# Patient Record
Sex: FEMALE | Race: AMERICAN INDIAN OR ALASKA NATIVE | NOT HISPANIC OR LATINO
[De-identification: names, ages, dates, MRNs, and addresses within clinical notes are randomized per-mention and may not be internally consistent; named-entity substitution may affect disease eponyms.]

---

## 2022-12-14 PROBLEM — Z00.00 ENCOUNTER FOR PREVENTIVE HEALTH EXAMINATION: Status: ACTIVE | Noted: 2022-12-14

## 2022-12-16 ENCOUNTER — APPOINTMENT (OUTPATIENT)
Dept: NEUROSURGERY | Facility: CLINIC | Age: 72
End: 2022-12-16

## 2022-12-16 VITALS — WEIGHT: 144 LBS | HEIGHT: 62 IN | BODY MASS INDEX: 26.5 KG/M2

## 2022-12-16 PROCEDURE — 99204 OFFICE O/P NEW MOD 45 MIN: CPT

## 2022-12-30 ENCOUNTER — APPOINTMENT (OUTPATIENT)
Dept: NEUROSURGERY | Facility: CLINIC | Age: 72
End: 2022-12-30
Payer: MEDICARE

## 2022-12-30 PROCEDURE — 99213 OFFICE O/P EST LOW 20 MIN: CPT

## 2022-12-30 NOTE — HISTORY OF PRESENT ILLNESS
[de-identified] : 72 year old female present with evaluation of her lower back pain. She has radicular symptoms down her left leg down lateral thigh and front of calf going to her toes. She has associated numbness in that area as well. This has been going on since September of this year with insidious onset. She states she has a longstanding history of lower back pain. She first had similar symptoms back in 2017 where she received 2 AMRIK w/ pain management with resolution of her symptoms. She states she went back to her pain management back in September for AMRIK. She has received 4 injections (last one in November 2022) with initial relief but now with no relief in her symptoms. She has been participating with physical therapy and acupuncture continously since September without relief. \par \par She has trialled celebrex and gabapentin. She states that the gabapentin helps her intermittently but the pain still affects her daily life. She ambulates with a cane for stabiliaation. She states she is unable to walk more than 2 minutes before having to stop secondary to pain. Pain is aggravated by standing/walking and alleviated by laying down flat completely. She states she is having urinary frequency and urgency and retention as well.  [FreeTextEntry1] : 72 year old female here for followup. She endorse low back pain with left leg radicular symptoms similar to last visit. Still endorse she is unable to walk, endorse she is unable to sleep at night secondary to pain. The pain medications used to help however does not seem to help her as much as the past

## 2022-12-30 NOTE — HISTORY OF PRESENT ILLNESS
[de-identified] : 72 year old female present with evaluation of her lower back pain. She has radicular symptoms down her left leg down lateral thigh and front of calf going to her toes. She has associated numbness in that area as well. This has been going on since September of this year with insidious onset. She states she has a longstanding history of lower back pain. She first had similar symptoms back in 2017 where she received 2 AMRIK w/ pain management with resolution of her symptoms. She states she went back to her pain management back in September for AMRIK. She has received 4 injections (last one in November 2022) with initial relief but now with no relief in her symptoms. She has been participating with physical therapy and acupuncture continously since September without relief. \par \par She has trialled celebrex and gabapentin. She states that the gabapentin helps her intermittently but the pain still affects her daily life. She ambulates with a cane for stabiliaation. She states she is unable to walk more than 2 minutes before having to stop secondary to pain. Pain is aggravated by standing/walking and alleviated by laying down flat completely. She states she is having urinary frequency and urgency and retention as well.  [FreeTextEntry1] : 72 year old female here for followup. She endorse low back pain with left leg radicular symptoms similar to last visit. Still endorse she is unable to walk, endorse she is unable to sleep at night secondary to pain. The pain medications used to help however does not seem to help her as much as the past

## 2022-12-30 NOTE — HISTORY OF PRESENT ILLNESS
[de-identified] : 72 year old female present with evaluation of her lower back pain. She has radicular symptoms down her left leg down lateral thigh and front of calf going to her toes. She has associated numbness in that area as well. This has been going on since September of this year with insidious onset. She states she has a longstanding history of lower back pain. She first had similar symptoms back in 2017 where she received 2 AMRIK w/ pain management with resolution of her symptoms. She states she went back to her pain management back in September for AMRIK. She has received 4 injections (last one in November 2022) with initial relief but now with no relief in her symptoms. She has been participating with physical therapy and acupuncture continously since September without relief. \par \par She has trialled celebrex and gabapentin. She states that the gabapentin helps her intermittently but the pain still affects her daily life. She ambulates with a cane for stabiliaation. She states she is unable to walk more than 2 minutes before having to stop secondary to pain. Pain is aggravated by standing/walking and alleviated by laying down flat completely. She states she is having urinary frequency and urgency and retention as well.  [FreeTextEntry1] : 72 year old female here for followup. She endorse low back pain with left leg radicular symptoms similar to last visit. Still endorse she is unable to walk, endorse she is unable to sleep at night secondary to pain. The pain medications used to help however does not seem to help her as much as the past

## 2022-12-30 NOTE — PHYSICAL EXAM
[FreeTextEntry1] : awake alert in no acute distress\par Motor strength intact other than left leg hip flex 4/5, knee flex/ext 3/5, DF 3/5 PF 5/5\par Sensation grossly intact\par Positive straight leg raise on the left\par Antaglic gait\par

## 2022-12-30 NOTE — ASSESSMENT
[FreeTextEntry1] : 72 year old female here for surgical planning. We redisucssed her MRI findings - shows l4-5 stenosis with severe left lateral recess narrowing. This is likely causing her symptoms; Patient is a candidate for surgery. Risks and benefits discussed with patient and daughter including but not limited to paralysis, persistent numbness, bleeding, need for further surgeries, CSF leak. We discussed that there is a possibility that the weakness does not get better. Patient and daughter verbalize understanding and would like to proceed with surgery.

## 2023-01-05 ENCOUNTER — INPATIENT (INPATIENT)
Facility: HOSPITAL | Age: 73
LOS: 0 days | Discharge: HOME | End: 2023-01-06
Attending: NEUROLOGICAL SURGERY | Admitting: NEUROLOGICAL SURGERY
Payer: MEDICARE

## 2023-01-05 VITALS
SYSTOLIC BLOOD PRESSURE: 160 MMHG | RESPIRATION RATE: 16 BRPM | OXYGEN SATURATION: 95 % | WEIGHT: 145.06 LBS | HEART RATE: 83 BPM | TEMPERATURE: 97 F | DIASTOLIC BLOOD PRESSURE: 83 MMHG

## 2023-01-05 LAB
ALBUMIN SERPL ELPH-MCNC: 5.3 G/DL — HIGH (ref 3.5–5.2)
ALP SERPL-CCNC: 143 U/L — HIGH (ref 30–115)
ALT FLD-CCNC: 14 U/L — SIGNIFICANT CHANGE UP (ref 0–41)
ANION GAP SERPL CALC-SCNC: 11 MMOL/L — SIGNIFICANT CHANGE UP (ref 7–14)
APPEARANCE UR: CLEAR — SIGNIFICANT CHANGE UP
APTT BLD: 42.5 SEC — HIGH (ref 27–39.2)
AST SERPL-CCNC: 21 U/L — SIGNIFICANT CHANGE UP (ref 0–41)
BASOPHILS # BLD AUTO: 0.04 K/UL — SIGNIFICANT CHANGE UP (ref 0–0.2)
BASOPHILS NFR BLD AUTO: 1.1 % — HIGH (ref 0–1)
BILIRUB SERPL-MCNC: 0.6 MG/DL — SIGNIFICANT CHANGE UP (ref 0.2–1.2)
BILIRUB UR-MCNC: NEGATIVE — SIGNIFICANT CHANGE UP
BUN SERPL-MCNC: 23 MG/DL — HIGH (ref 10–20)
CALCIUM SERPL-MCNC: 10.3 MG/DL — SIGNIFICANT CHANGE UP (ref 8.4–10.5)
CHLORIDE SERPL-SCNC: 102 MMOL/L — SIGNIFICANT CHANGE UP (ref 98–110)
CO2 SERPL-SCNC: 27 MMOL/L — SIGNIFICANT CHANGE UP (ref 17–32)
COLOR SPEC: COLORLESS — SIGNIFICANT CHANGE UP
CREAT SERPL-MCNC: 0.9 MG/DL — SIGNIFICANT CHANGE UP (ref 0.7–1.5)
DIFF PNL FLD: NEGATIVE — SIGNIFICANT CHANGE UP
EGFR: 67 ML/MIN/1.73M2 — SIGNIFICANT CHANGE UP
EOSINOPHIL # BLD AUTO: 0.07 K/UL — SIGNIFICANT CHANGE UP (ref 0–0.7)
EOSINOPHIL NFR BLD AUTO: 1.9 % — SIGNIFICANT CHANGE UP (ref 0–8)
GLUCOSE SERPL-MCNC: 95 MG/DL — SIGNIFICANT CHANGE UP (ref 70–99)
GLUCOSE UR QL: NEGATIVE — SIGNIFICANT CHANGE UP
HCT VFR BLD CALC: 39.8 % — SIGNIFICANT CHANGE UP (ref 37–47)
HGB BLD-MCNC: 12.8 G/DL — SIGNIFICANT CHANGE UP (ref 12–16)
IMM GRANULOCYTES NFR BLD AUTO: 0.5 % — HIGH (ref 0.1–0.3)
INR BLD: 0.95 RATIO — SIGNIFICANT CHANGE UP (ref 0.65–1.3)
KETONES UR-MCNC: NEGATIVE — SIGNIFICANT CHANGE UP
LEUKOCYTE ESTERASE UR-ACNC: NEGATIVE — SIGNIFICANT CHANGE UP
LYMPHOCYTES # BLD AUTO: 1.02 K/UL — LOW (ref 1.2–3.4)
LYMPHOCYTES # BLD AUTO: 27.1 % — SIGNIFICANT CHANGE UP (ref 20.5–51.1)
MAGNESIUM SERPL-MCNC: 2.2 MG/DL — SIGNIFICANT CHANGE UP (ref 1.8–2.4)
MCHC RBC-ENTMCNC: 30.2 PG — SIGNIFICANT CHANGE UP (ref 27–31)
MCHC RBC-ENTMCNC: 32.2 G/DL — SIGNIFICANT CHANGE UP (ref 32–37)
MCV RBC AUTO: 93.9 FL — SIGNIFICANT CHANGE UP (ref 81–99)
MONOCYTES # BLD AUTO: 0.35 K/UL — SIGNIFICANT CHANGE UP (ref 0.1–0.6)
MONOCYTES NFR BLD AUTO: 9.3 % — SIGNIFICANT CHANGE UP (ref 1.7–9.3)
NEUTROPHILS # BLD AUTO: 2.27 K/UL — SIGNIFICANT CHANGE UP (ref 1.4–6.5)
NEUTROPHILS NFR BLD AUTO: 60.1 % — SIGNIFICANT CHANGE UP (ref 42.2–75.2)
NITRITE UR-MCNC: NEGATIVE — SIGNIFICANT CHANGE UP
NRBC # BLD: 0 /100 WBCS — SIGNIFICANT CHANGE UP (ref 0–0)
PH UR: 6 — SIGNIFICANT CHANGE UP (ref 5–8)
PLATELET # BLD AUTO: 221 K/UL — SIGNIFICANT CHANGE UP (ref 130–400)
POTASSIUM SERPL-MCNC: 4.4 MMOL/L — SIGNIFICANT CHANGE UP (ref 3.5–5)
POTASSIUM SERPL-SCNC: 4.4 MMOL/L — SIGNIFICANT CHANGE UP (ref 3.5–5)
PROT SERPL-MCNC: 8.2 G/DL — HIGH (ref 6–8)
PROT UR-MCNC: NEGATIVE — SIGNIFICANT CHANGE UP
PROTHROM AB SERPL-ACNC: 10.8 SEC — SIGNIFICANT CHANGE UP (ref 9.95–12.87)
RBC # BLD: 4.24 M/UL — SIGNIFICANT CHANGE UP (ref 4.2–5.4)
RBC # FLD: 12.9 % — SIGNIFICANT CHANGE UP (ref 11.5–14.5)
SARS-COV-2 RNA SPEC QL NAA+PROBE: SIGNIFICANT CHANGE UP
SODIUM SERPL-SCNC: 140 MMOL/L — SIGNIFICANT CHANGE UP (ref 135–146)
SP GR SPEC: 1.01 — SIGNIFICANT CHANGE UP (ref 1.01–1.03)
TROPONIN T SERPL-MCNC: <0.01 NG/ML — SIGNIFICANT CHANGE UP
UROBILINOGEN FLD QL: SIGNIFICANT CHANGE UP
WBC # BLD: 3.77 K/UL — LOW (ref 4.8–10.8)
WBC # FLD AUTO: 3.77 K/UL — LOW (ref 4.8–10.8)

## 2023-01-05 PROCEDURE — 99285 EMERGENCY DEPT VISIT HI MDM: CPT

## 2023-01-05 PROCEDURE — 71045 X-RAY EXAM CHEST 1 VIEW: CPT | Mod: 26

## 2023-01-05 PROCEDURE — 93010 ELECTROCARDIOGRAM REPORT: CPT

## 2023-01-05 RX ORDER — MORPHINE SULFATE 50 MG/1
2 CAPSULE, EXTENDED RELEASE ORAL ONCE
Refills: 0 | Status: DISCONTINUED | OUTPATIENT
Start: 2023-01-05 | End: 2023-01-05

## 2023-01-05 RX ORDER — PANTOPRAZOLE SODIUM 20 MG/1
40 TABLET, DELAYED RELEASE ORAL
Refills: 0 | Status: DISCONTINUED | OUTPATIENT
Start: 2023-01-05 | End: 2023-01-06

## 2023-01-05 RX ADMIN — MORPHINE SULFATE 2 MILLIGRAM(S): 50 CAPSULE, EXTENDED RELEASE ORAL at 13:36

## 2023-01-05 RX ADMIN — MORPHINE SULFATE 2 MILLIGRAM(S): 50 CAPSULE, EXTENDED RELEASE ORAL at 10:58

## 2023-01-05 NOTE — ED PROVIDER NOTE - PHYSICAL EXAMINATION
- Physical Exam in ED  * General Appearance: Alert, cooperative, interactive, oriented to time, place, and person, in some distress secondary to pain  * Head: Normocephalic, without obvious abnormality, atraumatic  * Eyes: PERRL, conjunctiva/corneas clear, EOM's intact, fundi benign, both eyes  * Back: Symmetric, no curvature, ROM limited due to pain, minimal tenderness along palpation of lower back, no CVA tenderness, unable to assess for left raise leg since patient on a chair in hallway (was positive on left on 12/16), antalgic gait with cane  * Lungs: Respirations unlabored, Good bilateral air entry, normal breath sounds (Clear to auscultation bilaterally, no audible wheezes, crackles, or rhonchi)  * Heart: Regular Rate and Rhythm, normal S1 and S2, no audible murmur, rub, or gallop  * Abdomen: Symmetric, non-distended, no scar, soft, non-tender, bowel sounds active all four quadrants, no masses, no organomegaly (no hepatosplenomegaly)  * Extremities: Extremities normal, atraumatic, no cyanosis, no lower extremity pitting edema bilaterally, adequate dorsalis pedis pulses  * Pulses: 2+ and symmetric all extremities  * Skin: Skin color, texture, turgor normal, no rashes or lesions  * Lymph nodes: Cervical, supraclavicular, and axillary nodes normal  * Neurologic: CNII-XII intact, limited motor power 4/5 along left leg possibly limited by pain, limited reflex exam

## 2023-01-05 NOTE — ED PROVIDER NOTE - ATTENDING CONTRIBUTION TO CARE
I personally evaluated the patient. I reviewed the Resident’s or Physician Assistant’s note (as assigned above), and agree with the findings and plan except as documented in my note.   72-year-old female past medical history significant for dyslipidemia, diabetes, GERD presents to the ED with lower back pain rating to the left leg.  Pain radiates down the leg to the level of the toes.  Positive associated paresthesias of the left leg.  Patient has some relief at home with NSAIDs and gabapentin.  Patient is currently ambulating with a cane.  Patient is scheduled for L4-S1 decompressive laminectomy tomorrow.  Today patient's pain worsened in severity and she spoke with her neurosurgeon who recommended inpatient admission for pain control until surgery.  Vitals noted.  CONSTITUTIONAL: Well-appearing; well-nourished; in no apparent distress.   HEAD: Normocephalic; atraumatic.   EYES: PERRL; EOM intact. Conjunctiva normal B/L.   ENT: Normal pharynx with no tonsillar hypertrophy. MMM.  NECK: Supple; non-tender; no cervical lymphadenopathy.   CHEST: Normal chest excursion with respiration.   CARDIOVASCULAR: Normal S1, S2; no murmurs, rubs, or gallops.   RESPIRATORY: Normal chest excursion with respiration; breath sounds clear and equal bilaterally; no wheezes, rhonchi, or rales.  GI/: Normal bowel sounds; non-distended; non-tender.  BACK: No evidence of trauma or deformity. Non-tender to palpation. No CVA tenderness. + L SLR.   EXT: Normal ROM in all four extremities; non-tender to palpation; distal pulses are normal. No leg edema B/L.   SKIN: Normal for age and race; warm; dry; good turgor.  NEURO: A & O x 4; CN 2-12 intact. Neuro exam limited by pain.  No motor or sensory deficit of the lower extremities.  2+ patellar DTRs bilaterally. Patient with antalgic gait.

## 2023-01-05 NOTE — ED PROVIDER NOTE - CLINICAL SUMMARY MEDICAL DECISION MAKING FREE TEXT BOX
Lumbar Radiculopathy 72-year-old female with lumbar radiculopathy not improving with outpatient management.  Patient scheduled for decompressive laminectomy tomorrow.  Patient given opioid analgesia in the ED.  Neurosurgery consulted.  Patient admitted to neurosurgical service.

## 2023-01-05 NOTE — H&P ADULT - HISTORY OF PRESENT ILLNESS
72y F patient of Dr. Zimmerman comes to ED with intractable lumbar back pain with radiculopathy not alleviated by conservative measures. Pt scheduled for OR tomorrow 1.6 for Lumbar L4-5 microdiscectomy.  72y F patient of Dr. Zimmerman comes to ED with intractable lumbar back pain with radiculopathy not alleviated by conservative measures. Pt scheduled for OR tomorrow 1.6 for Lumbar L4-5 microdiscectomy.       Subjective: 72yFemale with a pmhx of LUMBAR RADICULOPATHY    LUMBAR RADICULOPATHY  ...    Gastro-esophageal reflux disease without esophagitis    Hyperlipidemia, unspecified    Radiculopathy, lumbar region    Type 2 diabetes mellitus without complications    ^SCIATIC NERVE PAIN    MEWS Score    Dyslipidemia    Diabetes mellitus    GERD (gastroesophageal reflux disease)    Lumbar radiculopathy    SCIATIC NERVE PAIN    SysAdmin_VisitLink      s/p     Allergies    No Known Allergies    Intolerances        Vital Signs Last 24 Hrs  T(C): 36.1 (05 Jan 2023 08:16), Max: 36.1 (05 Jan 2023 08:16)  T(F): 97 (05 Jan 2023 08:16), Max: 97 (05 Jan 2023 08:16)  HR: 83 (05 Jan 2023 08:16) (83 - 83)  BP: 160/83 (05 Jan 2023 08:16) (160/83 - 160/83)  BP(mean): --  RR: 16 (05 Jan 2023 08:16) (16 - 16)  SpO2: 95% (05 Jan 2023 08:16) (95% - 95%)      pantoprazole    Tablet 40 milliGRAM(s) Oral before breakfast          REVIEW OF SYSTEMS    [x ] A ten-point review of systems was otherwise negative except as noted.  [ ] Due to altered mental status/intubation, subjective information were not able to be obtained from the patient. History was obtained, to the extent possible, from review of the chart and collateral sources of information.      Exam:  Cantonese speaking pt  Resting in bed in NAD  AAOX3. Verbal function intact  Negative B/L SLR  Motor: MAEx4  B/L UE 5/5  RLE 5/5  LLE 4/5  B/L DF PF 5/5  No clonus   Sensation: SILT        CBC Full  -  ( 05 Jan 2023 10:44 )  WBC Count : 3.77 K/uL  RBC Count : 4.24 M/uL  Hemoglobin : 12.8 g/dL  Hematocrit : 39.8 %  Platelet Count - Automated : 221 K/uL  Mean Cell Volume : 93.9 fL  Mean Cell Hemoglobin : 30.2 pg  Mean Cell Hemoglobin Concentration : 32.2 g/dL  Auto Neutrophil # : 2.27 K/uL  Auto Lymphocyte # : 1.02 K/uL  Auto Monocyte # : 0.35 K/uL  Auto Eosinophil # : 0.07 K/uL  Auto Basophil # : 0.04 K/uL  Auto Neutrophil % : 60.1 %  Auto Lymphocyte % : 27.1 %  Auto Monocyte % : 9.3 %  Auto Eosinophil % : 1.9 %  Auto Basophil % : 1.1 %    01-05    140  |  102  |  23<H>  ----------------------------<  95  4.4   |  27  |  0.9    Ca    10.3      05 Jan 2023 10:44  Mg     2.2     01-05    TPro  8.2<H>  /  Alb  5.3<H>  /  TBili  0.6  /  DBili  x   /  AST  21  /  ALT  14  /  AlkPhos  143<H>  01-05    PT/INR - ( 05 Jan 2023 10:44 )   PT: 10.80 sec;   INR: 0.95 ratio         PTT - ( 05 Jan 2023 10:44 )  PTT:42.5 sec      Assessment/Plan:   This is a 72 year old female with lower lumbar back radicular pain, failed conservative management including pain treatment and AMRIK now admitted for tentative L4-5 MLD      Scheduled for L4-5 MLD tomorrow 1/6  Pre op labs; cbc bmp, coags, covid pcr  CXR, EKG  NPO at [facility]  Hold ac/ap   72y F patient of Dr. Zimmerman comes to ED with intractable lumbar back pain with radiculopathy not alleviated by conservative measures. Pt scheduled for OR tomorrow 1.6 for Lumbar L4-5 microdiscectomy.       Subjective: 72yFemale with a pmhx of LUMBAR RADICULOPATHY    LUMBAR RADICULOPATHY  ...    Gastro-esophageal reflux disease without esophagitis    Hyperlipidemia, unspecified    Radiculopathy, lumbar region    Type 2 diabetes mellitus without complications    ^SCIATIC NERVE PAIN    MEWS Score    Dyslipidemia    Diabetes mellitus    GERD (gastroesophageal reflux disease)    Lumbar radiculopathy    SCIATIC NERVE PAIN    SysAdmin_VisitLink      s/p     Allergies    No Known Allergies    Intolerances        Vital Signs Last 24 Hrs  T(C): 36.1 (05 Jan 2023 08:16), Max: 36.1 (05 Jan 2023 08:16)  T(F): 97 (05 Jan 2023 08:16), Max: 97 (05 Jan 2023 08:16)  HR: 83 (05 Jan 2023 08:16) (83 - 83)  BP: 160/83 (05 Jan 2023 08:16) (160/83 - 160/83)  BP(mean): --  RR: 16 (05 Jan 2023 08:16) (16 - 16)  SpO2: 95% (05 Jan 2023 08:16) (95% - 95%)      pantoprazole    Tablet 40 milliGRAM(s) Oral before breakfast          REVIEW OF SYSTEMS    [x ] A ten-point review of systems was otherwise negative except as noted.  [ ] Due to altered mental status/intubation, subjective information were not able to be obtained from the patient. History was obtained, to the extent possible, from review of the chart and collateral sources of information.      Exam:  Cantonese speaking pt  Resting in bed in NAD  AAOX3. Verbal function intact  no C/T/L ttp  Negative B/L SLR  Motor: MAEx4  B/L UE 5/5  RLE 5/5  LLE 4/5  B/L DF PF 5/5  No clonus   Sensation: SILT        CBC Full  -  ( 05 Jan 2023 10:44 )  WBC Count : 3.77 K/uL  RBC Count : 4.24 M/uL  Hemoglobin : 12.8 g/dL  Hematocrit : 39.8 %  Platelet Count - Automated : 221 K/uL  Mean Cell Volume : 93.9 fL  Mean Cell Hemoglobin : 30.2 pg  Mean Cell Hemoglobin Concentration : 32.2 g/dL  Auto Neutrophil # : 2.27 K/uL  Auto Lymphocyte # : 1.02 K/uL  Auto Monocyte # : 0.35 K/uL  Auto Eosinophil # : 0.07 K/uL  Auto Basophil # : 0.04 K/uL  Auto Neutrophil % : 60.1 %  Auto Lymphocyte % : 27.1 %  Auto Monocyte % : 9.3 %  Auto Eosinophil % : 1.9 %  Auto Basophil % : 1.1 %    01-05    140  |  102  |  23<H>  ----------------------------<  95  4.4   |  27  |  0.9    Ca    10.3      05 Jan 2023 10:44  Mg     2.2     01-05    TPro  8.2<H>  /  Alb  5.3<H>  /  TBili  0.6  /  DBili  x   /  AST  21  /  ALT  14  /  AlkPhos  143<H>  01-05    PT/INR - ( 05 Jan 2023 10:44 )   PT: 10.80 sec;   INR: 0.95 ratio         PTT - ( 05 Jan 2023 10:44 )  PTT:42.5 sec      Assessment/Plan:   This is a 72 year old female with lower lumbar back radicular pain, failed conservative management including pain treatment and AMRIK now admitted for tentative L4-5 MLD      Scheduled for L4-5 MLD tomorrow 1/6  Pre op labs; cbc bmp, coags, covid pcr  CXR, EKG  NPO at [facility]  Hold ac/ap

## 2023-01-05 NOTE — ED PROVIDER NOTE - OBJECTIVE STATEMENT
History of Present Illness  Ms. Vides is a 72 year old female patient known to have:  - Dyslipidemia  - Diabetes Mellitus  - GERD      She presented to the ED 01/05 for evaluation of worsening lower back pain.  History goes back to September when the patient started complaining of ...  On review of systems, patient denies any recent fever, chills, night sweats, URTI symptoms (cough, rhinorrhea, sore throat), urinary symptoms (urinary frequency, urgency, intermittence, dysuria, foul smelling urine, cloudy urine), change in bowel movements (diarrhea or constipation), abdominal pain, headache, nausea, or vomiting. No sick contacts. No recent travel or exposure to recent travelers.      Upon presentation to the ED, the patient was hemodynamically stable:  Vital Signs in ED History of Present Illness  Ms. Vides is a 72 year old female patient known to have:  - Dyslipidemia  - Diabetes Mellitus  - GERD      She presented to the ED 01/05 for evaluation of worsening lower back pain.  History goes back to September when the patient started complaining of lower back pain.  The onset was insidious and the pain has been sharp and radiating to left leg (lateral aspect of thigh, front of calf, to toes).  It has been associated with numbness along toes and urinary frequency.  The pain has been exacerbated by standing or walking, partially relieved by celebrex 200mg QD and Gabapentin 300mg BID/TID, thus limiting her ability to ambulate (now she uses a cane).  Of note, patient had chronic lower back pain since 2017 s/p 2 x AMRIK injections with pain management s/p resolution.  After it recurred in September, patient sought medical attention at pain management s/p 4 x AMRIK injections (last one 11/2022), PT, and acupuncture with minimal relief.  She has been evaluated by Dr Zimmerman (neurosurgeon) on 12/16 and 12/30 who reviewed an outpatient MR C-spine from 11/2022 showing multiple level degenerative disease (most at L4-5 with left lateral recess narrowing and bilateral moderate L5-S1 neural foraminal stenosis).  He recommended L4-5 and L5-S1 decompressive laminectomy since patient failed conservative therapy, and patient + family agreed (scheduled for 01/06 at 08:00 AM per patient).  Since today 01/05 pain was severe (09/10 intensity), patient contacted Dr Horan who recommended ED presentation.      On review of systems, patient denies any recent fever, chills, night sweats, URTI symptoms (cough, rhinorrhea, sore throat), change in bowel movements (diarrhea or constipation), abdominal pain, headache, nausea, or vomiting.   No sick contacts.  No recent travel or exposure to recent travelers.      Upon presentation to the ED, the patient was hemodynamically stable:  Vital Signs in ED  - /83mmHg  - HR 83bpm  - RR 16 bpm  - T 36.1 degrees        - Patient received IV morphine 2 x1 for pain  - Labs will be ordered  - Repeat imaging will be discussed with neurosurgery team History of Present Illness  Ms. Vides is a 72 year old female patient known to have:  - Dyslipidemia  - Diabetes Mellitus  - GERD      She presented to the ED 01/05 for evaluation of worsening lower back pain.  History goes back to September when the patient started complaining of lower back pain.  The onset was insidious and the pain has been sharp and radiating to left leg (lateral aspect of thigh, front of calf, to toes).  It has been associated with numbness along toes and urinary frequency.  The pain has been exacerbated by standing or walking, partially relieved by celebrex 200mg QD and Gabapentin 300mg BID/TID, thus limiting her ability to ambulate (now she uses a cane).  Of note, patient had chronic lower back pain since 2017 s/p 2 x AMRIK injections with pain management s/p resolution.  After it recurred in September, patient sought medical attention at pain management s/p 4 x AMRIK injections (last one 11/2022), PT, and acupuncture with minimal relief.  She has been evaluated by Dr Zimmerman (neurosurgeon) on 12/16 and 12/30 who reviewed an outpatient MR C-spine from 11/2022 showing multiple level degenerative disease (most at L4-5 with left lateral recess narrowing and bilateral moderate L5-S1 neural foraminal stenosis).  He recommended L4-5 and L5-S1 decompressive laminectomy since patient failed conservative therapy, and patient + family agreed (scheduled for 01/06 at 08:00 AM per patient).  Since today 01/05 pain was severe (09/10 intensity), patient contacted Dr Horan who recommended ED presentation.      On review of systems, patient denies any recent fever, chills, night sweats, URTI symptoms (cough, rhinorrhea, sore throat), change in bowel movements (diarrhea or constipation), abdominal pain, headache, nausea, or vomiting.   No sick contacts.  No recent travel or exposure to recent travelers.      Upon presentation to the ED, the patient was hemodynamically stable:  Vital Signs in ED  - /83mmHg  - HR 83bpm  - RR 16 bpm  - T 36.1 degrees        - Patient received IV morphine 2 x1 for pain  - Labs will be ordered  - Neurosurgery team was contacted and patient will be admitted under their service for L4-5 and possible L5-S1 decompressive laminectomy on 01/06/2022

## 2023-01-05 NOTE — H&P ADULT - ASSESSMENT
Admit to Neurosurgery Service Dr. Zimmerman   OR tomorroqw 1.6.23 for L4-5 MLD  NPO @ midnight tonight   Type and Screen   PT/PTT/INR  CXR  ECG  CBC / CMP

## 2023-01-05 NOTE — ED PROVIDER NOTE - NSICDXPASTMEDICALHX_GEN_ALL_CORE_FT
PAST MEDICAL HISTORY:  Diabetes mellitus     Dyslipidemia     GERD (gastroesophageal reflux disease)

## 2023-01-06 ENCOUNTER — RESULT REVIEW (OUTPATIENT)
Age: 73
End: 2023-01-06

## 2023-01-06 ENCOUNTER — TRANSCRIPTION ENCOUNTER (OUTPATIENT)
Age: 73
End: 2023-01-06

## 2023-01-06 VITALS
OXYGEN SATURATION: 95 % | RESPIRATION RATE: 18 BRPM | HEART RATE: 94 BPM | DIASTOLIC BLOOD PRESSURE: 73 MMHG | SYSTOLIC BLOOD PRESSURE: 132 MMHG

## 2023-01-06 LAB
GLUCOSE BLDC GLUCOMTR-MCNC: 116 MG/DL — HIGH (ref 70–99)
GLUCOSE BLDC GLUCOMTR-MCNC: 123 MG/DL — HIGH (ref 70–99)
HCV AB S/CO SERPL IA: 0.04 COI — SIGNIFICANT CHANGE UP
HCV AB SERPL-IMP: SIGNIFICANT CHANGE UP

## 2023-01-06 PROCEDURE — 88304 TISSUE EXAM BY PATHOLOGIST: CPT | Mod: 26

## 2023-01-06 PROCEDURE — 88311 DECALCIFY TISSUE: CPT | Mod: 26

## 2023-01-06 PROCEDURE — 63030 LAMOT DCMPRN NRV RT 1 LMBR: CPT | Mod: LT

## 2023-01-06 RX ORDER — HYDROCODONE BITARTRATE AND ACETAMINOPHEN 7.5; 325 MG/15ML; MG/15ML
1 SOLUTION ORAL
Qty: 30 | Refills: 0
Start: 2023-01-06 | End: 2023-01-15

## 2023-01-06 RX ORDER — SODIUM CHLORIDE 9 MG/ML
1000 INJECTION INTRAMUSCULAR; INTRAVENOUS; SUBCUTANEOUS
Refills: 0 | Status: DISCONTINUED | OUTPATIENT
Start: 2023-01-06 | End: 2023-01-06

## 2023-01-06 RX ORDER — CEPHALEXIN 500 MG
1 CAPSULE ORAL
Qty: 15 | Refills: 0
Start: 2023-01-06 | End: 2023-01-10

## 2023-01-06 RX ORDER — ONDANSETRON 8 MG/1
8 TABLET, FILM COATED ORAL ONCE
Refills: 0 | Status: DISCONTINUED | OUTPATIENT
Start: 2023-01-06 | End: 2023-01-06

## 2023-01-06 RX ORDER — HYDROMORPHONE HYDROCHLORIDE 2 MG/ML
0.5 INJECTION INTRAMUSCULAR; INTRAVENOUS; SUBCUTANEOUS
Refills: 0 | Status: DISCONTINUED | OUTPATIENT
Start: 2023-01-06 | End: 2023-01-06

## 2023-01-06 RX ORDER — ACETAMINOPHEN 500 MG
1000 TABLET ORAL ONCE
Refills: 0 | Status: COMPLETED | OUTPATIENT
Start: 2023-01-06 | End: 2023-01-06

## 2023-01-06 RX ORDER — ACETAMINOPHEN WITH CODEINE 300MG-30MG
1 TABLET ORAL
Qty: 30 | Refills: 0
Start: 2023-01-06 | End: 2023-01-15

## 2023-01-06 RX ORDER — SODIUM CHLORIDE 9 MG/ML
1000 INJECTION, SOLUTION INTRAVENOUS
Refills: 0 | Status: DISCONTINUED | OUTPATIENT
Start: 2023-01-06 | End: 2023-01-06

## 2023-01-06 RX ORDER — CYCLOBENZAPRINE HYDROCHLORIDE 10 MG/1
1 TABLET, FILM COATED ORAL
Qty: 30 | Refills: 0
Start: 2023-01-06 | End: 2023-01-15

## 2023-01-06 RX ORDER — DOCUSATE SODIUM 100 MG
1 CAPSULE ORAL
Qty: 30 | Refills: 0
Start: 2023-01-06 | End: 2023-01-15

## 2023-01-06 RX ADMIN — PANTOPRAZOLE SODIUM 40 MILLIGRAM(S): 20 TABLET, DELAYED RELEASE ORAL at 05:51

## 2023-01-06 RX ADMIN — Medication 1000 MILLIGRAM(S): at 02:40

## 2023-01-06 RX ADMIN — HYDROMORPHONE HYDROCHLORIDE 0.5 MILLIGRAM(S): 2 INJECTION INTRAMUSCULAR; INTRAVENOUS; SUBCUTANEOUS at 11:20

## 2023-01-06 RX ADMIN — SODIUM CHLORIDE 40 MILLILITER(S): 9 INJECTION INTRAMUSCULAR; INTRAVENOUS; SUBCUTANEOUS at 01:57

## 2023-01-06 RX ADMIN — Medication 400 MILLIGRAM(S): at 02:01

## 2023-01-06 RX ADMIN — HYDROMORPHONE HYDROCHLORIDE 0.5 MILLIGRAM(S): 2 INJECTION INTRAMUSCULAR; INTRAVENOUS; SUBCUTANEOUS at 11:35

## 2023-01-06 NOTE — PRE-ANESTHESIA EVALUATION ADULT - NSANTHPEFT_GEN_ALL_CORE
NAD  CTABL  RRR  As per H&P, motor wise:  no C/T/L ttp  Negative B/L SLR  Motor: MAEx4  B/L UE 5/5  RLE 5/5  LLE 4/5  B/L DF PF 5/5  No clonus

## 2023-01-06 NOTE — DISCHARGE NOTE PROVIDER - HOSPITAL COURSE
This is a 72 year old female to the surgical service and subsequently underwent L4-5 MLD with Dr. Zimmerman on 1/6. Pt tolerated procedure well and postoperatively was transferred to the PACU. Patient's diet was advanced as tolerated and pain was controlled with IV, and subsequently po, pain medication as needed. Pt's post-operative hospital course was uncomplicated.    At time of discharge, the patient was afebrile, tolerating a regular diet, voiding appropriately, ambulating without difficulty, making flatus, and the patient's pain was well controlled. VS were WNL and pt was hemodynamically stable. Pt was medically cleared for discharge and patient was provided with discharge instructions regarding, but not limited to medication regimen and follow up.   This is a 72 year old female admitted to the surgical service and subsequently underwent L4-5 MLD with Dr. Zimmerman on 1/6. Pt tolerated procedure well and postoperatively was transferred to the PACU. Patient's diet was advanced as tolerated and pain was controlled with IV, and subsequently po, pain medication as needed. Pt's post-operative hospital course was uncomplicated.    At time of discharge, the patient was afebrile, tolerating a regular diet, voiding appropriately, ambulating without difficulty, making flatus, and the patient's pain was well controlled. VS were WNL and pt was hemodynamically stable. Pt was medically cleared for discharge and patient was provided with discharge instructions regarding, but not limited to medication regimen and follow up.

## 2023-01-06 NOTE — DISCHARGE NOTE NURSING/CASE MANAGEMENT/SOCIAL WORK - PATIENT PORTAL LINK FT
You can access the FollowMyHealth Patient Portal offered by [facility] by registering at the following website: [email or web address]. By joining [facility]’s FollowMyHealth portal, you will also be able to view your health information using other applications (apps) compatible with our system.

## 2023-01-06 NOTE — DISCHARGE NOTE PROVIDER - CARE PROVIDER_API CALL
Reggie Zimmerman)  Neurosurgery  [address] [place] [id number]  Phone: [phone]  Fax: [phone]  Follow Up Time:

## 2023-01-06 NOTE — PATIENT PROFILE ADULT - NSPRESCRUSEDDRG_GEN_A_NUR
From: Medina You  To: Khalida Méndez  Sent: 12/2/2021 6:10 PM CST  Subject: Buproprian increase    Hi Dr, I'm finding that my medication isn't working as well lately. I think it may be seasonal combined with life events. We've been stuck living in our basement since I talked to you last while our kitchen is being remodeled. It turned into a much longer project than anticipated. Can I switch back to the extended release (24hr) in the meantime?     Thanks,  Medina   
No

## 2023-01-06 NOTE — DISCHARGE NOTE PROVIDER - NSDCFUADDINST_GEN_ALL_CORE_FT
- Upon discharge,  please call to schedule a follow up with Dr. Zimmerman in 1-2 weeks.  - Upon discharge, please call to make a follow up appointment with your primary care provider to discuss your recent hospitalization/operation.  - Keep dressings dry for 48 hours after which you may remove dressing and cleanse with soap and water in the shower (no scrubbing). Leave the steri strips (white tape) on your incisions, they will fall off on their own. Run water and soap over incision sites and pat dry (no scrubbing). No submerging your incision sites in water (i.e. no swimming or baths) for 2-3 weeks and avoid exposing the area to jets/streams of water.   - You can resume your normal activities as tolerated, but avoid heavy (>15lb.) lifting and strenuous exercise for 4-6 weeks.    - You were prescribed Vicodin for pain, take these only as needed.  Your pain should subside over the next few days.  While taking narcotic pain medications, you should not drive or operate heavy machinery. If taking with other medications containing acetaminophen (Tylenol), reduce your dose of percocet so that you  do not exceed 3000mg of acetaminophen per day.  - Narcotic pain medicine tends to cause constipation, you have been prescribed colace 3 times a day to help prevent that. Hold the colace if you start to have loose stools.  - If you experience fevers, chills, increasing abdominal pain, nausea, vomiting, inability to pass stool or gas, bleeding, or any other acute symptoms, please call your doctor and report to the emergency room immediately for further management.

## 2023-01-06 NOTE — PRE-OP CHECKLIST - 1.
Confirmation specimen needed as per blood bank; Dr. Singleton made aware[id number] utilized. Entered chart to time out patient.

## 2023-01-06 NOTE — DISCHARGE NOTE PROVIDER - NSDCMRMEDTOKEN_GEN_ALL_CORE_FT
cephalexin 500 mg oral capsule: 1 cap(s) orally 3 times a day MDD:3  Colace 100 mg oral capsule: 1 cap(s) orally 3 times a day, As Needed -for constipation MDD:3   cyclobenzaprine 5 mg oral tablet: 1 tab(s) orally every 8 hours, As Needed -for muscle spasm MDD:3   hydrocodone-acetaminophen 2.5 mg-325 mg oral tablet: 1 tab(s) orally every 8 hours, As Needed -for severe pain MDD:3   cephalexin 500 mg oral capsule: 1 cap(s) orally 3 times a day MDD:3  codeine-acetaminophen 30 mg-300 mg oral tablet: 1 tab(s) orally every 8 hours, As Needed -for cough - for severe pain MDD:3   Colace 100 mg oral capsule: 1 cap(s) orally 3 times a day, As Needed -for constipation MDD:3   cyclobenzaprine 5 mg oral tablet: 1 tab(s) orally every 8 hours, As Needed -for muscle spasm MDD:3   hydrocodone-acetaminophen 2.5 mg-325 mg oral tablet: 1 tab(s) orally every 8 hours, As Needed -for severe pain MDD:3

## 2023-01-06 NOTE — PATIENT PROFILE ADULT - FALL HARM RISK - HARM RISK INTERVENTIONS

## 2023-01-09 LAB — SURGICAL PATHOLOGY STUDY: SIGNIFICANT CHANGE UP

## 2023-01-13 DIAGNOSIS — E11.9 TYPE 2 DIABETES MELLITUS WITHOUT COMPLICATIONS: ICD-10-CM

## 2023-01-13 DIAGNOSIS — K21.9 GASTRO-ESOPHAGEAL REFLUX DISEASE WITHOUT ESOPHAGITIS: ICD-10-CM

## 2023-01-13 DIAGNOSIS — M51.16 INTERVERTEBRAL DISC DISORDERS WITH RADICULOPATHY, LUMBAR REGION: ICD-10-CM

## 2023-01-13 DIAGNOSIS — E78.5 HYPERLIPIDEMIA, UNSPECIFIED: ICD-10-CM

## 2023-01-20 ENCOUNTER — APPOINTMENT (OUTPATIENT)
Dept: NEUROSURGERY | Facility: CLINIC | Age: 73
End: 2023-01-20
Payer: MEDICARE

## 2023-01-20 PROBLEM — K21.9 GASTRO-ESOPHAGEAL REFLUX DISEASE WITHOUT ESOPHAGITIS: Chronic | Status: ACTIVE | Noted: 2023-01-05

## 2023-01-20 PROBLEM — E11.9 TYPE 2 DIABETES MELLITUS WITHOUT COMPLICATIONS: Chronic | Status: ACTIVE | Noted: 2023-01-05

## 2023-01-20 PROBLEM — E78.5 HYPERLIPIDEMIA, UNSPECIFIED: Chronic | Status: ACTIVE | Noted: 2023-01-05

## 2023-01-20 PROCEDURE — 99024 POSTOP FOLLOW-UP VISIT: CPT

## 2023-01-20 NOTE — HISTORY OF PRESENT ILLNESS
[FreeTextEntry1] : 72 year old female s/p L4-5 microdiscectomy on 1/6/2023 present for follow-up. Today she reports her pain symptos have significantly improved. Feels she no longer has pain but still having persistent weakness especially her foot.

## 2023-01-20 NOTE — ASSESSMENT
[FreeTextEntry1] : 72 year old female s/p left L4-5 microdiscectomy 1/6/2023  present for follow-up of her follow up visit. Postoperatively she is doing well. She is still having persistent weakness. We discussed that this is normal and that these symptoms are preexisting. We discuss that there is a long rehabilitation process and there is a chance that the weakness does not improve. We discussed this pre operatively and today. Patient today verbalize understanding. Continue rehabiliation. Continue physical therapy - new script given today.

## 2023-01-20 NOTE — PHYSICAL EXAM
[FreeTextEntry1] : awake alert in no acute distress\par Motor strength left leg HF/KF/KE 4/5, DF 3/5 PF 5/5 EHL 3/5\par Sensory intact\par DTR intact\par Back incision c/d/i without drainage

## 2023-03-17 ENCOUNTER — APPOINTMENT (OUTPATIENT)
Dept: NEUROSURGERY | Facility: CLINIC | Age: 73
End: 2023-03-17
Payer: MEDICARE

## 2023-03-17 PROCEDURE — 99024 POSTOP FOLLOW-UP VISIT: CPT

## 2025-04-18 NOTE — PATIENT PROFILE ADULT - FUNCTIONAL ASSESSMENT - DAILY ACTIVITY 1.
[FreeTextEntry1] : The patient initially considered robotic-assisted supracervical hysterectomy with sacrocolpopexy. However, due to her recurrent postmenopausal bleeding and benign endometrial sampling, the case was discussed with gynecologic oncology, and a recommendation for intraoperative frozen section was made.  Various management options for prolapse, both surgical and non-surgical, were discussed. Given PMB, the plan was changed to a total laparoscopic hysterectomy. We discussed the risks and benefits of both supracervical and total hysterectomy, including the slightly increased risk of mesh exposure with TLH.  The rationale for proceeding with TLH was to obtain a complete uterine specimen for pathology given the concerning bleeding. After a thorough discussion, the patient understood and consented to proceed with robotic-assisted TLH with uterosacral ligament suspension and intraoperative frozen section. We also reviewed plan for management of stress urinary incontinence, with include concomitant placement of a mid-urethral sling.   We reviewed risks of the surgery including but not limited to: bleeding, infection, pain, urinary retention, failure to reduce prolapse, prolapse recurrence, persistence or recurrence of stress urinary incontinence, voiding dysfunction, dyspareunia, bowel obstruction, venous thromboembolism (VTE), and mesh erosion/exposure. We discussed the risk of possible conversion to open surgery via exploratory laparotomy. We discussed the risk of injury to her bladder, ureters, urethra, vagina, rectum and bowel, as well as blood vessels, particularly in light of her previous surgical history and possible presence of scar tissue. We discussed the possibility of needing a blood transfusion. The patient does not have any restrictions to this if needed. We discussed the possibility of going home with a catheter and of needing additional surgery in the future.   All questions were answered. She expressed understanding of the above and would like to proceed with the scheduled surgery. I provided pre-operative and post-operative instructions and counseling on expectations, including pain and bowel management regimen: Tylenol/Motrin and MiraLAX postop. I also instructed patient to start taking a stool softener the week prior to her procedure.  3 = A little assistance